# Patient Record
Sex: FEMALE | Race: WHITE
[De-identification: names, ages, dates, MRNs, and addresses within clinical notes are randomized per-mention and may not be internally consistent; named-entity substitution may affect disease eponyms.]

---

## 2017-01-27 ENCOUNTER — HOSPITAL ENCOUNTER (OUTPATIENT)
Age: 27
Discharge: HOME | End: 2017-01-27
Payer: MEDICAID

## 2017-01-27 DIAGNOSIS — R10.9: Primary | ICD-10-CM

## 2017-01-30 ENCOUNTER — HOSPITAL ENCOUNTER (OUTPATIENT)
Age: 27
Discharge: HOME | End: 2017-01-30
Payer: MEDICAID

## 2017-01-30 DIAGNOSIS — R10.11: Primary | ICD-10-CM

## 2018-03-30 ENCOUNTER — HOSPITAL ENCOUNTER (OUTPATIENT)
Dept: HOSPITAL 76 - DI | Age: 28
Discharge: HOME | End: 2018-03-30
Attending: ORTHOPAEDIC SURGERY
Payer: COMMERCIAL

## 2018-03-30 DIAGNOSIS — M75.102: Primary | ICD-10-CM

## 2018-03-30 NOTE — MRI REPORT
EXAM:

LEFT SHOULDER MRI WITHOUT CONTRAST

 

EXAM DATE: 3/30/2018 02:07 PM.

 

CLINICAL HISTORY: Left shoulder rotator cuff tear.

 

COMPARISON: None.

 

TECHNIQUE: Multiplanar, multisequence T1-weighted and fluid-sensitive sequences of the shoulder witho
ut contrast. Other: None. 

 

FINDINGS: 

Acromioclavicular Region: The acromion is Type II unipartite. The acromioclavicular joint is unremark
able. The coracoacromial and coracoclavicular ligaments are intact. No subacromial/subdeltoid bursal 
fluid.

 

Glenohumeral Region: No subluxation. No effusion or loose bodies. The articular cartilage is unremark
able. The glenohumeral ligaments and joint capsule are unremarkable.

 

Bone Marrow: No fractures. Some mild red marrow reconversion. Benign.

 

Labrum: The labrum is unremarkable on this nonarthrographic study.

 

Musculature/Rotator Cuff: The subscapularis, supraspinatus, infraspinatus, and teres minor tendons ar
e intact. No edema or fatty atrophy.

 

Biceps Tendon: The long head of the biceps tendon and biceps pulley are intact.

 

Other: The subcutaneous tissues are unremarkable.

 

IMPRESSION: 

1. 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 No fractures, some mild red marrow reconve
rsion is seen. 

2. AC joint, glenohumeral articulation, rotator cuff, long head of biceps, labrum and capsular struct
ures have a normal appearance.

 

[facility] SECTION

Referring Provider Line: [phone]

 

SITE ID: [id number]

## 2018-04-06 ENCOUNTER — HOSPITAL ENCOUNTER (OUTPATIENT)
Dept: HOSPITAL 76 - LAB.F | Age: 28
Discharge: HOME | End: 2018-04-06
Attending: NURSE PRACTITIONER
Payer: COMMERCIAL

## 2018-04-06 DIAGNOSIS — F32.9: Primary | ICD-10-CM

## 2018-04-06 DIAGNOSIS — Z11.3: ICD-10-CM

## 2018-04-06 DIAGNOSIS — R53.83: ICD-10-CM

## 2018-04-06 LAB
BASOPHILS NFR BLD AUTO: 0 10^3/UL (ref 0–0.1)
BASOPHILS NFR BLD AUTO: 0.8 %
EOSINOPHIL # BLD AUTO: 0.1 10^3/UL (ref 0–0.7)
EOSINOPHIL NFR BLD AUTO: 1.1 %
ERYTHROCYTE [DISTWIDTH] IN BLOOD BY AUTOMATED COUNT: 13.6 % (ref 12–15)
HGB UR QL STRIP: 13.6 G/DL (ref 12–16)
LYMPHOCYTES # SPEC AUTO: 2 10^3/UL (ref 1.5–3.5)
LYMPHOCYTES NFR BLD AUTO: 33.8 %
MCH RBC QN AUTO: 29 PG (ref 27–31)
MCHC RBC AUTO-ENTMCNC: 32.8 G/DL (ref 32–36)
MCV RBC AUTO: 88.4 FL (ref 81–99)
MONOCYTES # BLD AUTO: 0.4 10^3/UL (ref 0–1)
MONOCYTES NFR BLD AUTO: 6.6 %
NEUTROPHILS # BLD AUTO: 3.4 10^3/UL (ref 1.5–6.6)
NEUTROPHILS # SNV AUTO: 6 X10^3/UL (ref 4.8–10.8)
NEUTROPHILS NFR BLD AUTO: 57.7 %
PDW BLD AUTO: 8.2 FL (ref 7.9–10.8)
PLATELET # BLD: 299 10^3/UL (ref 130–450)
RBC MAR: 4.69 10^6/UL (ref 4.2–5.4)
TSH SERPL-ACNC: 1.62 UIU/ML (ref 0.34–5.6)
VIT B12 SERPL-MCNC: 539 PG/ML (ref 180–914)

## 2018-04-06 PROCEDURE — 36415 COLL VENOUS BLD VENIPUNCTURE: CPT

## 2018-04-06 PROCEDURE — 82306 VITAMIN D 25 HYDROXY: CPT

## 2018-04-06 PROCEDURE — 86803 HEPATITIS C AB TEST: CPT

## 2018-04-06 PROCEDURE — 85025 COMPLETE CBC W/AUTO DIFF WBC: CPT

## 2018-04-06 PROCEDURE — 82607 VITAMIN B-12: CPT

## 2018-04-06 PROCEDURE — 86592 SYPHILIS TEST NON-TREP QUAL: CPT

## 2018-04-06 PROCEDURE — 84443 ASSAY THYROID STIM HORMONE: CPT

## 2018-04-06 PROCEDURE — 81599 UNLISTED MAAA: CPT

## 2018-04-06 PROCEDURE — 87389 HIV-1 AG W/HIV-1&-2 AB AG IA: CPT

## 2018-04-07 LAB
HEPATITIS C ANTIBODY: (no result)
HIV AG/AB 4TH GEN: (no result)
SIGNAL TO CUT-OFF: 0 (ref ?–1)

## 2019-12-06 ENCOUNTER — HOSPITAL ENCOUNTER (OUTPATIENT)
Dept: HOSPITAL 76 - EMS | Age: 29
End: 2019-12-06
Attending: SURGERY
Payer: SELF-PAY

## 2019-12-06 DIAGNOSIS — Z04.1: Primary | ICD-10-CM
